# Patient Record
Sex: MALE | Race: WHITE
[De-identification: names, ages, dates, MRNs, and addresses within clinical notes are randomized per-mention and may not be internally consistent; named-entity substitution may affect disease eponyms.]

---

## 2020-02-21 ENCOUNTER — HOSPITAL ENCOUNTER (EMERGENCY)
Dept: HOSPITAL 25 - UCCORT | Age: 25
Discharge: HOME | End: 2020-02-21
Payer: COMMERCIAL

## 2020-02-21 VITALS — DIASTOLIC BLOOD PRESSURE: 80 MMHG | SYSTOLIC BLOOD PRESSURE: 113 MMHG

## 2020-02-21 DIAGNOSIS — F17.210: ICD-10-CM

## 2020-02-21 DIAGNOSIS — R10.32: Primary | ICD-10-CM

## 2020-02-21 DIAGNOSIS — Z98.890: ICD-10-CM

## 2020-02-21 DIAGNOSIS — N50.812: ICD-10-CM

## 2020-02-21 PROCEDURE — G0463 HOSPITAL OUTPT CLINIC VISIT: HCPCS

## 2020-02-21 PROCEDURE — 81003 URINALYSIS AUTO W/O SCOPE: CPT

## 2020-02-21 PROCEDURE — 99201: CPT

## 2020-02-21 NOTE — XMS REPORT
Continuity of Care Document (CCD)

 Created on:2019



Patient:Enrico Herndon JR

Sex:Male

:1995

External Reference #:[id number]





Demographics







 Address  [address] [place] [address]

 

 Home Phone  [phone]

 

 Preferred Language  en

 

 Marital Status  Not  or 

 

 Adventist Affiliation  Unknown

 

 Race  White

 

 Ethnic Group  Not  or 









Author







 Name  Paulo Oconnell MD

 

 Address  [address] [place] [address]









Care Team Providers







 Name  Role  Phone

 

 Paulo Oconnell MD - Family Medicine  Care Team Information   [phone]
[phone]

 

 Jacob Jin MD - Gastroenterology  Care Team Information   [phone]
[phone]

 

 Amie Gordon MD - Vascular  Care Team Information   [phone]
[phone]



 Surgery    

 

 Kaveh Albert MD - Surgery  Care Team Information   [phone]









Problems







 Description

 

 No Information Available







Social History







 Type  Date  Description  Comments

 

 Birth Sex    Unknown  

 

 Tobacco Use  Start: Unknown  Light tobacco smoker  X 1 YEAR 1 Q 2-3



     (10 or fewer  WEEKS 19;



     cigarettes/day)  CONTINUES TO SMOKE <



       10 CIGS/DAY.

 

 Smoking Status  Reviewed: 19  Light tobacco smoker  X 1 YEAR 1 Q 2-3



     (10 or fewer  WEEKS 19;



     cigarettes/day)  CONTINUES TO SMOKE <



       10 CIGS/DAY.

 

 ETOH Use    Rarely consumes alcohol  

 

 Recreational Drug Use    Regularly uses  FOR PTSD SYMPTOMS



     Marijuana  







Allergies, Adverse Reactions, Alerts







 Description

 

 No Known Drug Allergies







Medications







 Active Medications  SIG  Qnty  Indications  Ordering Provider  Date

 

 Sulfamethoxazole/Trime  1 by mouth  60tabs  N45.1  Paulo Oconnell MD  2019



 thoprim DS  twice a day        



         800-160mg          



 Tablets          



           

 

 Tobramycin  3 drop both  10ml  H10.9  Paulo Oconnell MD  2019



         0.3% Solution  eyes three        



   times a day x 7        



   days        







Medications Administered in Office







 Medication  SIG  Qnty  Indications  Ordering Provider  Date

 

 PPD                      Injection        Paulo Oconnell MD  2015



           







Immunizations







 CPT Code  Status  Date  Vaccine  Lot #

 

 [id number]  Given  2019  Gardasil-9 (HPV) Nonavalent 2-3 Dose Schedule Im  
[id number]

 

 86414  Given  10/16/2019  Afluria Or Fluvirin Flu Vac Intramuscular  

 

 43375  Given  2017  Tdap (Adacel) Ages 7 And Above Only  [id number]









 









 17000  Refused  2019  Influenza Vac, Quadrivalent, Split, 0.5mL Dosage, 
Im Use  







Vital Signs







 Date  Vital  Result  Comment

 

 2019  9:59am  Body Temperature  97.9 F  









 Weight  282.00 lb  

 

 Heart Rate  69 /min  

 

 BP Systolic  120 mmHg  

 

 BP Diastolic  84 mmHg  

 

 Respiratory Rate  14 /min  

 

 Height  73 inches  6'1"

 

 O2 % BldC Oximetry  96 %  

 

 BMI (Body Mass Index)  37.2 kg/m2  

 

 Urine Dipstick - Blood  neg  

 

 Urine Dipstick - Protein  neg  

 

 Urine Dipstick - Glucose  neg  

 

 Urine Dipstick - Leukocytes  neg  

 

 Left ear audiology results  wnl  

 

 Right ear audiology results  wnl  









 2019  2:02pm  Body Temperature  98.7 F  









 Weight  285.00 lb  

 

 Heart Rate  84 /min  

 

 BP Systolic  128 mmHg  

 

 BP Diastolic  76 mmHg  

 

 Respiratory Rate  17 /min  

 

 Height  74 inches  6'2"

 

 O2 % BldC Oximetry  97 %  

 

 BMI (Body Mass Index)  36.6 kg/m2  

 

 Urine Dipstick - Blood  neg  

 

 Urine Dipstick - Protein  neg  

 

 Urine Dipstick - Glucose  neg  

 

 Urine Dipstick - Leukocytes  neg  







Results







 Test  Acquired Date  Facility  Test  Result  H/L  Range  Note

 

 Laboratory test finding  2019  [facility]  <pending>      







Procedures







 Date  Code  Description  Status

 

 09/10/2019  23877434  Colonoscopy  Completed







Medical Devices







 Description

 

 No Information Available







Encounters







 Type  Date  Location  Provider  Dx  Diagnosis

 

 Office Visit  2019  2:00p  Paulo Montanez MD  E66.9  Obesity, 
unspecified









 N45.1  Epididymitis

 

 K62.5  Hemorrhage of anus and rectum

 

 R10.9  Unspecified abdominal pain

 

 Z68.36  Body mass index (BMI) 36.0-36.9, adult







Assessments







 Date  Code  Description  Provider

 

 2019  E66.9  Obesity, unspecified  Paulo Oconnell MD

 

 2019  Z68.37  Body mass index (BMI) 37.0-37.9, adult  Paulo Oconnell MD

 

 2019  Z13.228  Encounter for screening for other metabolic  Paulo Oconnell MD



     disorders  

 

 2019  Z13.6  Encounter for screening for cardiovascular  Paulo Oconnell MD



     disorders  

 

 2019  Z13.29  Encounter for screening for oth suspected  Paulo Oconnell MD



     endocrine disorder  

 

 2019  R10.30  Lower abdominal pain, unspecified  Paulo Oconnell MD

 

 2019  Z23  Encounter for immunization  Paulo Oconnell MD

 

 2019  E66.9  Obesity, unspecified  Paulo Oconnell MD

 

 2019  N45.1  Epididymitis  Paulo Oconnell MD

 

 2019  K62.5  Hemorrhage of anus and rectum  Paulo Oconnell MD

 

 2019  R10.9  Unspecified abdominal pain  Paulo Oconnell MD

 

 2019  Z68.36  Body mass index (BMI) 36.0-36.9, adult  Paulo Oconnell MD







Plan of Treatment

2019 - Paulo Oconnell MDE66.9 Obesity, unspecifiedComments:RECOMMEND 
CONTINUED DIET AND EXERCISE. OFFERED CONSULT WITH OUR NUTRITIONIST.Z68.37 Body 
mass index (BMI) 37.0-37.9, adultComments:5A's for Obesity Counseling1. ASSESS: 
Patient presents with a BMI of 37.2, would like to discuss weight loss 
options.2. ADVISE: Discussed long term complications of obesity in particular 
increased riskfor development of diabetes and heat disease as well as the many 
health benefits that come with a healthy BMI.3. AGREE: Goal is 1 pound or 
better per week weight loss. The patient understands and agrees to the plan 
outlined today.4. ASSIST: Reduce caloric intake, reduce proportion of  
carbohydrates indiet, establish daily exercise pattern, keep food log.5. ARRANGE
: Follow up scheduled in 6 MONTHS toassess and adjust treatment plan if 
needed.Time spent counseling the patient on obesity is 15 minutes. Obesity 
Screening Completed ()Z13.228 Encounter for screening for other metabolic 
hakpveizwJ68.6 Encounter for screening for cardiovascular zjmsnxmyiS03.29 
Encounter for screening for oth suspected endocrine xgtwqzfoY91.30 Lower 
abdominal pain, unspecifiedReferral:Kaveh Albert MD, Surgery,[id number] 
Encounter for immunization



Functional Status







 Description

 

 No Information Available







Mental Status







 Description

 

 No Information Available







Referrals







 Refer to Dr  Reason for Referral  Status  Appt Date

 

 Kaveh Albert MD  LEFT GROIN PAIN RULE OUT LEFT  INGUINAL HERNIA  Created  00/










 [address]

 

 [place] [address]

 

 [phone]

 

 









 Christofer Muniz  faxed referral and info to gastro, waiting to hear  Closed  



   back with appt info. 19    









 [address]

 

 [address]

 

 [place] [address]

 

 [phone]

 

 









 Amie Gordon MD  faxed referral and info to Dr Laws  Closed  2019



   office, they will call back with appt info.    



   19    









 [facility]

 

 [address], [place]   [address] [phone]

## 2020-02-21 NOTE — UC
Abdominal Pain Male HPI





- HPI Summary


HPI Summary: 





24-year-old male who has intermittent sharp left lower quadrant abdominal pain 

which he describes as pressure when he urinates.  He denies any burning on 

urination and no frequency.  He denies any penile discharge.  He is sexually 

active with one person.  No history of sexually transmitted diseases.  Normal 

bowel movement today at noon.  He is concerned that he might have an abdominal 

hernia.  He states occasionally the pain is sharp down into his testicle 

however the testicle itself is nonpainful, not swollen.  He had a history of a 

hernia on his right side for which he has had surgical repair.  He denies any 

fever or chills, denies any nausea vomiting or diarrhea.  He is pain-free here.





- History of Current Complaint


Chief Complaint: UCAbdominalPain


Stated Complaint: STOMACH/GROIN PAIN


Time Seen by Provider: 02/21/20 17:20


Hx Obtained From: Patient


Onset/Duration: Gradual Onset, Lasting Days - This started approximately 5 days 

ago.


Timing: Intermittent Episodes Lasting: - Patient describes the pain as 

intermittent and sharp.  He is pain-free here.


Severity Initially: Mild


Severity Currently: Mild


Pain Intensity: 6


Location: Other - Patient states occasionally the pain is in his lower mid 

abdomen and sometimes in his left lower quadrant.


Radiates: Yes


Radiates to: Inguinal - He states occasionally the sharp pain radiates down to 

his left testicle but the testicle itself is not painful or swollen.


Character: Sharp


Aggravating Factor(s): Nothing


Alleviating Factor(s): Position - He states at times when he has the sharp pain 

he can alleviated just with change in his position


Associated Signs And Symptoms: Positive: Negative





- Risk Factors


Testicular Torsion: Negative





- Allergies/Home Medications


Allergies/Adverse Reactions: 


 Allergies











Allergy/AdvReac Type Severity Reaction Status Date / Time


 


No Known Allergies Allergy   Verified 02/21/20 16:58











Home Medications: 


 Home Medications





Ibuprofen TAB* [Advil TAB*] 200 mg PO Q6H PRN 02/21/20 [History Confirmed 02/21/ 20]


Ketoconazole 2 % CREAM (NF) [Nizoral 2% CREAM (NF)] 1 applic TOPICAL BID 02/21/ 20 [History Confirmed 02/21/20]











PMH/Surg Hx/FS Hx/Imm Hx


Previously Healthy: Yes


Other History Of: 


   Negative For: HIV





- Surgical History


Surgical History: Yes


Surgery Procedure, Year, and Place: Left ACL, 2013, [facility].  right inguinal 

hernia 3/19





- Family History


Known Family History: Positive: None, Non-Contributory





- Social History


Lives: With Family


Alcohol Use: Occasionally


Substance Use Type: None


Smoking Status (MU): Light Every Day Tobacco Smoker


Type: Cigarettes


Amount Used/How Often: one cig per day


Length of Time of Smoking/Using Tobacco: 6 Months


Have You Smoked in the Last Year: Yes





- Immunization History


Most Recent Influenza Vaccination: Not the 2014/2015 season





Review of Systems


All Other Systems Reviewed And Are Negative: Yes


Gastrointestinal: Positive: Abdominal Pain - Intermittent sharp left lower 

quadrant abdominal pain which occasionally shoots down into his left testicle.  

Patient is pain-free here.


Genitourinary: Positive: Negative


Is Patient Immunocompromised?: No





Physical Exam


Triage Information Reviewed: Yes


Appearance: Well-Appearing, No Pain Distress, Well-Nourished


Vital Signs: 


 Initial Vital Signs











Temp  98.9 F   02/21/20 17:00


 


Pulse  97   02/21/20 17:00


 


Resp  20   02/21/20 17:00


 


BP  113/80   02/21/20 17:00


 


Pulse Ox  99   02/21/20 17:00











Vital Signs Reviewed: Yes


Eyes: Positive: Conjunctiva Clear


ENT: Positive: Pharynx normal, TMs normal, Uvula midline


Neck: Positive: Supple, Nontender, No Lymphadenopathy


Respiratory: Positive: Lungs clear, Normal breath sounds, No respiratory 

distress, No accessory muscle use


Cardiovascular: Positive: RRR, No Murmur, Pulses Normal, Brisk Capillary Refill


Abdomen Description: Positive: Nontender, No Organomegaly, Soft, Other: - I had 

the patient do a partial sit up and I did not palpate any hernia abdominal or 

inguinal..  Negative: CVA Tenderness (R), CVA Tenderness (L), Distended, 

Guarding, Hernia @, Hepatomegaly, McBurney's Point Tenderness, Splenomegaly


Bowel Sounds: Positive: Present


Male Genital Exam: Positive: Normal Genitalia, No Hernia.  Negative: Epididymal 

Tenderness, Erythema, Inguinal Tenderness, Scrotum Tenderness (R), Scrotum 

Tenderness (L), Testicular Tenderness (R), Testicular Tenderness (L), Urethral 

Discharge


Musculoskeletal Exam: Normal


Neurological Exam: Normal


Psychological Exam: Normal


Skin Exam: Normal





Abd Pain Male Course/Dx





- Course


Course Of Treatment: 





Urinalysis: Negative





The patient is comfortable here, he moves around the room without difficulty 

and gets up and down from the exam table without difficulty.  At this point 

time I don't believe the patient has a hernia and I don't feel this is a 

surgical abdomen.  I encouraged him that if he continues to have pain through 

the weekend he is to follow-up with his primary care provider on Monday for 

recheck.  If he has any worsening symptoms or the pain worsens he is to go to 

the emergency room for further evaluation and treatment.  Patient is agreeable 

to this plan of action.





- Differential Dx/Clinical Impression


Provider Diagnosis: 


 Abdominal pain








Discharge ED





- Sign-Out/Discharge


Documenting (check all that apply): Patient Departure


All imaging exams completed and their final reports reviewed: No Studies





- Discharge Plan


Condition: Good


Disposition: HOME


Patient Education Materials:  Abdominal Pain (ED)


Referrals: 


Paulo Oconnell MD [Primary Care Provider] - 


Additional Instructions: 


You are to follow-up in the emergency room if you have any worsening pain, fever

, vomiting.  If you continue to have pain but does not worsening you are to 

definitely be rechecked by your primary care provider or the emergency room.





- Billing Disposition and Condition


Condition: GOOD


Disposition: Home





- Attestation Statements


Provider Attestation: 





Per institutional requirements, I have reviewed the chart, however, I was not 

consulted specifically or made aware of this patient by the  midlevel provider.

  I did not personally evaluate, interact with, or disposition this patient. EK